# Patient Record
Sex: FEMALE | Race: OTHER | URBAN - NONMETROPOLITAN AREA
[De-identification: names, ages, dates, MRNs, and addresses within clinical notes are randomized per-mention and may not be internally consistent; named-entity substitution may affect disease eponyms.]

---

## 2023-08-30 ENCOUNTER — FOLLOW UP (OUTPATIENT)
Facility: LOCATION | Age: 68
End: 2023-08-30

## 2023-08-30 DIAGNOSIS — H02.834: ICD-10-CM

## 2023-08-30 DIAGNOSIS — H02.831: ICD-10-CM

## 2023-08-30 DIAGNOSIS — H25.813: ICD-10-CM

## 2023-08-30 PROCEDURE — 92012 INTRM OPH EXAM EST PATIENT: CPT

## 2023-08-30 ASSESSMENT — VISUAL ACUITY
OU_CC: 20/25+2
OD_CC: 20/30-2
OS_CC: 20/40+2
OS_PH: 20/20

## 2024-03-06 ENCOUNTER — ESTABLISHED PATIENT (OUTPATIENT)
Facility: LOCATION | Age: 69
End: 2024-03-06

## 2024-03-06 DIAGNOSIS — D31.32: ICD-10-CM

## 2024-03-06 DIAGNOSIS — H43.813: ICD-10-CM

## 2024-03-06 DIAGNOSIS — H25.813: ICD-10-CM

## 2024-03-06 DIAGNOSIS — H52.4: ICD-10-CM

## 2024-03-06 PROCEDURE — 92310B CONTACT LEN 60

## 2024-03-06 PROCEDURE — 92014 COMPRE OPH EXAM EST PT 1/>: CPT

## 2024-03-06 PROCEDURE — 92015 DETERMINE REFRACTIVE STATE: CPT

## 2024-03-06 ASSESSMENT — TONOMETRY
OS_IOP_MMHG: 13
OD_IOP_MMHG: 14

## 2024-03-06 ASSESSMENT — KERATOMETRY
OD_AXISANGLE_DEGREES: 164
OS_AXISANGLE2_DEGREES: 65
OS_K1POWER_DIOPTERS: 42.25
OD_K1POWER_DIOPTERS: 42.00
OD_K2POWER_DIOPTERS: 42.50
OS_AXISANGLE_DEGREES: 155
OD_AXISANGLE2_DEGREES: 74
OS_K2POWER_DIOPTERS: 42.75

## 2024-03-06 ASSESSMENT — VISUAL ACUITY
OD_CC: 20/30-2
OD_GLARE: 20/50
OS_GLARE: 20/40
OS_CC: 20/30-2

## 2024-11-27 ENCOUNTER — OFFICE VISIT (OUTPATIENT)
Dept: URGENT CARE | Age: 69
End: 2024-11-27
Payer: COMMERCIAL

## 2024-11-27 VITALS
DIASTOLIC BLOOD PRESSURE: 70 MMHG | RESPIRATION RATE: 18 BRPM | OXYGEN SATURATION: 98 % | HEART RATE: 70 BPM | WEIGHT: 144 LBS | SYSTOLIC BLOOD PRESSURE: 132 MMHG | TEMPERATURE: 96.9 F

## 2024-11-27 DIAGNOSIS — N39.0 URINARY TRACT INFECTION WITHOUT HEMATURIA, SITE UNSPECIFIED: ICD-10-CM

## 2024-11-27 LAB
POC APPEARANCE, URINE: CLEAR
POC BILIRUBIN, URINE: NEGATIVE
POC BLOOD, URINE: NEGATIVE
POC COLOR, URINE: YELLOW
POC GLUCOSE, URINE: NEGATIVE MG/DL
POC KETONES, URINE: NEGATIVE MG/DL
POC LEUKOCYTES, URINE: NEGATIVE
POC NITRITE,URINE: NEGATIVE
POC PH, URINE: 7 PH
POC PROTEIN, URINE: NEGATIVE MG/DL
POC SPECIFIC GRAVITY, URINE: 1.01
POC UROBILINOGEN, URINE: 0.2 EU/DL

## 2024-11-27 PROCEDURE — 87086 URINE CULTURE/COLONY COUNT: CPT

## 2024-11-27 RX ORDER — NITROFURANTOIN 25; 75 MG/1; MG/1
100 CAPSULE ORAL 2 TIMES DAILY
Qty: 14 CAPSULE | Refills: 0 | Status: SHIPPED | OUTPATIENT
Start: 2024-11-27 | End: 2024-12-04

## 2024-11-27 ASSESSMENT — ENCOUNTER SYMPTOMS: FREQUENCY: 1

## 2024-11-27 NOTE — PATIENT INSTRUCTIONS
Macrobid  [facility] send out- if ABT needs to be changed, will call   If worsening symptoms, please go to ER    Please follow up with your primary provider within one week if symptoms do not improve.  You may schedule an appointment online at \[email or web address] or call [phone]. Go to the Emergency Department if symptoms significantly worsen or if you develop chest pain or shortness of breath.

## 2024-11-27 NOTE — PROGRESS NOTES
Subjective   Patient ID: Kahlil Yun is a 69 y.o. female. They present today with a chief complaint of UTI.    History of Present Illness  Kahlil Yun is a 69 y.o. female who presents to the clinic for urinary pressure, urgency for the past 2 days.  Pt denies any chest pain, sob, N/V at this time in clinic.             Past Medical History  Allergies as of 11/27/2024 - Reviewed 11/27/2024   Allergen Reaction Noted    Lisinopril Cough 11/27/2024       (Not in a hospital admission)       No past medical history on file.    No past surgical history on file.         Review of Systems  Review of Systems   Genitourinary:  Positive for frequency and urgency.   All other systems reviewed and are negative.                                 Objective    Vitals:    11/27/24 1214   BP: 132/70   Pulse: 70   Resp: 18   Temp: 36.1 °C (96.9 °F)   SpO2: 98%   Weight: 65.3 kg (144 lb)     No LMP recorded.    Physical Exam  Constitutional:       Appearance: Normal appearance.   Abdominal:      General: Abdomen is flat. Bowel sounds are normal.      Palpations: Abdomen is soft.      Tenderness: There is no right CVA tenderness or left CVA tenderness.   Neurological:      General: No focal deficit present.      Mental Status: She is alert and oriented to person, place, and time. Mental status is at baseline.   Psychiatric:         Mood and Affect: Mood normal.         Behavior: Behavior normal.         Procedures    Point of Care Test & Imaging Results from this visit  Results for orders placed or performed in visit on 11/27/24   POCT UA Automated manually resulted   Result Value Ref Range    POC Color, Urine Yellow Straw, Yellow, Light-Yellow    POC Appearance, Urine Clear Clear    POC Glucose, Urine NEGATIVE NEGATIVE mg/dl    POC Bilirubin, Urine NEGATIVE NEGATIVE    POC Ketones, Urine NEGATIVE NEGATIVE mg/dl    POC Specific Gravity, Urine 1.010 1.005 - 1.035    POC Blood, Urine NEGATIVE NEGATIVE    POC PH, Urine 7.0 No  Reference Range Established PH    POC Protein, Urine NEGATIVE NEGATIVE, 30 (1+) mg/dl    POC Urobilinogen, Urine 0.2 0.2, 1.0 EU/DL    Poc Nitrite, Urine NEGATIVE NEGATIVE    POC Leukocytes, Urine NEGATIVE NEGATIVE      No results found.    Diagnostic study results (if any) were reviewed by SHELLY Torres.    Assessment/Plan   Allergies, medications, history, and pertinent labs/EKGs/Imaging reviewed by SHELLY Torres.     Medical Decision Making  History and examination are suggestive of UTI despite negative urinalysis results. No evidence of pyelonephritis or sepsis.  Will treat based upon history with pending culture results. Advised to return to clinic or present to ER if symptoms change or worsen otherwise follow up with pcp. patient verbalized understanding and agrees with plan.    Orders and Diagnoses  Diagnoses and all orders for this visit:  Urinary tract infection without hematuria, site unspecified  -     POCT UA Automated manually resulted  -     Urine Culture  -     nitrofurantoin, macrocrystal-monohydrate, (Macrobid) 100 mg capsule; Take 1 capsule (100 mg) by mouth 2 times a day for 7 days.      Medical Admin Record      Patient disposition: Home    Electronically signed by SHELLY Torres  1:22 PM

## 2024-11-29 LAB — BACTERIA UR CULT: NO GROWTH

## 2025-03-13 ENCOUNTER — COMPREHENSIVE EXAM (OUTPATIENT)
Age: 70
End: 2025-03-13

## 2025-03-13 DIAGNOSIS — H25.813: ICD-10-CM

## 2025-03-13 DIAGNOSIS — H35.373: ICD-10-CM

## 2025-03-13 DIAGNOSIS — H52.4: ICD-10-CM

## 2025-03-13 DIAGNOSIS — E11.9: ICD-10-CM

## 2025-03-13 DIAGNOSIS — D31.32: ICD-10-CM

## 2025-03-13 DIAGNOSIS — H43.813: ICD-10-CM

## 2025-03-13 PROCEDURE — 92014 COMPRE OPH EXAM EST PT 1/>: CPT

## 2025-03-13 PROCEDURE — 92134 CPTRZ OPH DX IMG PST SGM RTA: CPT

## 2025-03-13 PROCEDURE — 92015 DETERMINE REFRACTIVE STATE: CPT
